# Patient Record
Sex: FEMALE | URBAN - METROPOLITAN AREA
[De-identification: names, ages, dates, MRNs, and addresses within clinical notes are randomized per-mention and may not be internally consistent; named-entity substitution may affect disease eponyms.]

---

## 2023-01-27 ENCOUNTER — OFFICE VISIT (OUTPATIENT)
Dept: URBAN - METROPOLITAN AREA CLINIC 10 | Facility: CLINIC | Age: 58
End: 2023-01-27
Payer: COMMERCIAL

## 2023-01-27 DIAGNOSIS — H25.13 AGE-RELATED NUCLEAR CATARACT, BILATERAL: Primary | ICD-10-CM

## 2023-01-27 DIAGNOSIS — H04.122 DRY EYE SYNDROME OF LEFT LACRIMAL GLAND: ICD-10-CM

## 2023-01-27 DIAGNOSIS — H52.4 PRESBYOPIA: ICD-10-CM

## 2023-01-27 DIAGNOSIS — H02.831 DERMATOCHALASIS OF RIGHT UPPER LID: ICD-10-CM

## 2023-01-27 DIAGNOSIS — H02.834 DERMATOCHALASIS OF LEFT UPPER LID: ICD-10-CM

## 2023-01-27 PROCEDURE — 92134 CPTRZ OPH DX IMG PST SGM RTA: CPT | Performed by: OPTOMETRIST

## 2023-01-27 PROCEDURE — 99204 OFFICE O/P NEW MOD 45 MIN: CPT | Performed by: OPTOMETRIST

## 2023-01-27 ASSESSMENT — VISUAL ACUITY
OS: 20/25
OD: 20/20

## 2023-01-27 ASSESSMENT — INTRAOCULAR PRESSURE
OD: 18
OS: 18

## 2023-01-27 NOTE — IMPRESSION/PLAN
Impression: Dermatochalasis of right upper lid: H02.831. Plan: Patient symptomatic, see oculoplastics.

## 2023-01-27 NOTE — IMPRESSION/PLAN
Impression: Dermatochalasis of left upper lid: H02.834. Plan: Patient symptomatic, see oculoplastics.

## 2023-01-27 NOTE — IMPRESSION/PLAN
Impression: Presbyopia: H52.4. Plan: Mrx released for optimal vision. Office Note  Chief Complaint   Patient presents with   • Follow-up       HPI: Patient is a 46 year old male here for chronic conditions.  Allergic rhinitis- stating that Zyrtec is not doing much, is not using azelastine nasal spray, feels congested more in the morning when he wakes up, denies cough, wheezing, sinus pressure.  Complains of heartburn for few months, eats spicy food, has been taking OTC omeprazole which helps a little, denies nausea, vomiting, abdominal pain, blood in stool.  Vitamin D deficiency-not taking any supplements.  Complains of side of the left big toe protruding and causing pain, is been going on for months, some days worse than others, has not seen podiatry in the past, denies numbness, tingling, trauma, redness.    Review of Systems   Constitutional: Negative for activity change, appetite change and fatigue.   HENT: Positive for congestion and rhinorrhea. Negative for postnasal drip and sinus pressure.    Respiratory: Negative for cough and shortness of breath.    Cardiovascular: Negative for chest pain.   Gastrointestinal: Negative for abdominal pain, nausea and vomiting.   Endocrine: Negative for cold intolerance.   Genitourinary: Negative for difficulty urinating, dysuria, frequency, hematuria and urgency.   Musculoskeletal: Negative for back pain.   Neurological: Negative for tremors, weakness and numbness.       Current Outpatient Medications   Medication Sig Dispense Refill   • Cetirizine HCl (ZYRTEC PO)      • azelastine (ASTELIN) 0.1 % nasal spray Spray 2 sprays in each nostril 2 times daily. Use in each nostril as directed 30 mL 2   • omeprazole (PRILOSEC) 40 MG capsule Take 1 capsule by mouth daily. 30 capsule 5     No current facility-administered medications for this visit.        ALLERGIES:  No Known Allergies    Patient Active Problem List   Diagnosis   • Allergic rhinitis   • Anxiety   • Class 2 obesity due to excess calories with body mass index (BMI) of 36.0 to 36.9 in  adult   • Fatigue   • Palpitations   • Constipation due to slow transit   • Vaso vagal episode   • Vitamin D deficiency   • Gastroesophageal reflux disease   • Bunion of great toe of left foot        Visit Vitals  /68   Pulse 66   Ht 5' 10\" (1.778 m)   Wt 114 kg (251 lb 5.2 oz)   SpO2 98%   BMI 36.06 kg/m²       LABS  Lab Results   Component Value Date    SODIUM 141 07/13/2018    POTASSIUM 4.5 07/13/2018    CHLORIDE 107 07/13/2018    CO2 25 07/13/2018    BUN 11 07/13/2018    CREATININE 1.11 07/13/2018    CALCIUM 9.1 07/13/2018    ALBUMIN 4.2 07/13/2018    BILIRUBIN 0.4 07/13/2018    ALKPT 92 07/13/2018    GPT 20 07/13/2018    AST 12 07/13/2018    GLUCOSE 84 07/13/2018     Lab Results   Component Value Date    CHOLESTEROL 188 07/13/2018    TRIGLYCERIDE 119 07/13/2018    HDL 40 07/13/2018    CALCLDL 124 07/13/2018     Lab Results   Component Value Date    TLYMPH 33 07/13/2018    PMON 6 07/13/2018    PEOS 5 07/13/2018    PBASO 1 07/13/2018    ANEUT 3.3 07/13/2018    ALYMS 2 07/13/2018    SANDEE 0.4 07/13/2018    AEOS 0.3 07/13/2018    ABASO 0.1 07/13/2018    TDIF AUTOMATED DIFFERENTIAL 07/13/2018    NRBCRE 0 07/13/2018    PIMGR 0 07/13/2018    AIMGR 0 07/13/2018     No results found for: HGBA1C  Lab Results   Component Value Date    TSH 3.441 07/13/2018     Lab Results   Component Value Date    PSA 1.06 07/13/2018     Lab Results   Component Value Date    VITD25 25.3 (L) 07/13/2018     Lab Results   Component Value Date    VB12 635 07/27/2017     No results found for: URMIC, MALBCR      Physical Exam   Constitutional: He is oriented to person, place, and time. He appears well-developed and well-nourished.   HENT:   Nose: Mucosal edema present. No rhinorrhea.   Eyes: EOM are normal.   Neck: Normal range of motion. Neck supple.   Cardiovascular: Normal rate, regular rhythm and normal heart sounds.   Pulmonary/Chest: Effort normal and breath sounds normal.   Abdominal: Soft. He exhibits no distension. There is no  tenderness. Musculoskeletal: Normal range of motion.      Left foot: Deformity (Bunion) present.        Feet:      Neurological: He is alert and oriented to person, place, and time.   Skin: Skin is warm.       Assessment and Plan  Problem List Items Addressed This Visit        Respiratory    Allergic rhinitis - Primary     Azelastine nasal spray given.  Continue Zyrtec.  Sinus rinse discussed.         Relevant Medications    azelastine (ASTELIN) 0.1 % nasal spray       Digestive    Vitamin D deficiency     Check levels.         Relevant Orders    VITAMIN D -25 HYDROXY    Gastroesophageal reflux disease     Check H. pylori  PPI given.  Dietary restrictions discussed and information provided.         Relevant Medications    omeprazole (PRILOSEC) 40 MG capsule    Other Relevant Orders    HELICOBACTER PYLORI STOOL ANTIGEN       Musculoskeletal    Bunion of great toe of left foot     Podiatry referral provided.         Relevant Orders    SERVICE TO PODIATRY      Other Visit Diagnoses     Screening for diabetes mellitus        Relevant Orders    COMPREHENSIVE METABOLIC PANEL    CBC WITH DIFFERENTIAL    Lipid screening        Relevant Orders    LIPID PANEL WITH REFLEX      Check labs.    Dmitriy Mcfarland PA-C 08/21/19 9:27 AM

## 2023-01-27 NOTE — IMPRESSION/PLAN
Impression: Dry eye syndrome of left lacrimal gland: H04.122. Plan: Recommend Omega 3s, artificial tears QID or PRN, warm compresses, and lid scrubs. RTC for normal recall, sooner with issues.

## 2023-03-07 ENCOUNTER — OFFICE VISIT (OUTPATIENT)
Dept: URBAN - METROPOLITAN AREA CLINIC 34 | Facility: CLINIC | Age: 58
End: 2023-03-07
Payer: COMMERCIAL

## 2023-03-07 DIAGNOSIS — H02.834 DERMATOCHALASIS OF LEFT UPPER LID: ICD-10-CM

## 2023-03-07 DIAGNOSIS — Z41.1 ENCOUNTER FOR COSMETIC SURGERY: ICD-10-CM

## 2023-03-07 DIAGNOSIS — H02.831 DERMATOCHALASIS OF RIGHT UPPER LID: Primary | ICD-10-CM

## 2023-03-07 PROCEDURE — 99204 OFFICE O/P NEW MOD 45 MIN: CPT | Performed by: OPHTHALMOLOGY

## 2023-03-07 PROCEDURE — 92285 EXTERNAL OCULAR PHOTOGRAPHY: CPT | Performed by: OPHTHALMOLOGY

## 2023-03-07 NOTE — IMPRESSION/PLAN
Impression: Encounter for cosmetic surgery: Z41.1. Plan: pt demonstrates eyebrow ptosis which contributes to upper eyelid hooding and we discussed surgical options including endobrow, direct brow, pretrichial brow lift, and internal brow lifting techniques. After review of these options, we selected cosmetic ENDOSCOPIC brow lift as the best option given the patient's anatomy, desires, and means. We discussed the r/b/a of this procedure. Discussed risks of temporal nerve branch damage as well as hair loss in the area. Discussed hair color/bleaching treatments with the patient today, avoid for 3 weeks before and after brow lift surgery. Patient instructed to stop smoking for 1 month before and after surgery.
